# Patient Record
Sex: FEMALE | URBAN - METROPOLITAN AREA
[De-identification: names, ages, dates, MRNs, and addresses within clinical notes are randomized per-mention and may not be internally consistent; named-entity substitution may affect disease eponyms.]

---
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
## 2023-06-05 ENCOUNTER — OFFICE VISIT (OUTPATIENT)
Dept: FAMILY MEDICINE | Age: 18
End: 2023-06-05

## 2023-06-05 VITALS
TEMPERATURE: 98.3 F | OXYGEN SATURATION: 98 % | RESPIRATION RATE: 16 BRPM | HEIGHT: 63 IN | WEIGHT: 170 LBS | HEART RATE: 96 BPM | DIASTOLIC BLOOD PRESSURE: 68 MMHG | SYSTOLIC BLOOD PRESSURE: 106 MMHG | BODY MASS INDEX: 30.12 KG/M2

## 2023-06-05 DIAGNOSIS — R61 HYPERHIDROSIS: ICD-10-CM

## 2023-06-05 DIAGNOSIS — R74.8 ELEVATED ALKALINE PHOSPHATASE LEVEL: ICD-10-CM

## 2023-06-05 DIAGNOSIS — Z00.00 HEALTH MAINTENANCE EXAMINATION: Primary | ICD-10-CM

## 2023-06-05 PROCEDURE — 99385 PREV VISIT NEW AGE 18-39: CPT | Performed by: PHYSICIAN ASSISTANT

## 2023-06-05 PROCEDURE — 80053 COMPREHEN METABOLIC PANEL: CPT | Performed by: INTERNAL MEDICINE

## 2023-06-05 PROCEDURE — 80061 LIPID PANEL: CPT | Performed by: INTERNAL MEDICINE

## 2023-06-05 ASSESSMENT — PATIENT HEALTH QUESTIONNAIRE - PHQ9
SUM OF ALL RESPONSES TO PHQ9 QUESTIONS 1 AND 2: 0
CLINICAL INTERPRETATION OF PHQ2 SCORE: NO FURTHER SCREENING NEEDED
SUM OF ALL RESPONSES TO PHQ9 QUESTIONS 1 AND 2: 0
2. FEELING DOWN, DEPRESSED OR HOPELESS: NOT AT ALL
1. LITTLE INTEREST OR PLEASURE IN DOING THINGS: NOT AT ALL

## 2023-06-05 ASSESSMENT — ENCOUNTER SYMPTOMS
ABDOMINAL PAIN: 0
COUGH: 0
SHORTNESS OF BREATH: 0
DIZZINESS: 0
FEVER: 0

## 2023-06-06 PROBLEM — R74.8 ELEVATED ALKALINE PHOSPHATASE LEVEL: Status: ACTIVE | Noted: 2023-06-06

## 2023-06-06 LAB
ALBUMIN SERPL-MCNC: 3.8 G/DL (ref 3.6–5.1)
ALBUMIN/GLOB SERPL: 1.1 {RATIO} (ref 1–2.4)
ALP SERPL-CCNC: 122 UNITS/L (ref 42–110)
ALT SERPL-CCNC: 17 UNITS/L
ANION GAP SERPL CALC-SCNC: 15 MMOL/L (ref 7–19)
AST SERPL-CCNC: 17 UNITS/L
BILIRUB SERPL-MCNC: 0.4 MG/DL (ref 0.2–1)
BUN SERPL-MCNC: 7 MG/DL (ref 6–20)
BUN/CREAT SERPL: 12 (ref 7–25)
CALCIUM SERPL-MCNC: 9.1 MG/DL (ref 8.4–10.2)
CHLORIDE SERPL-SCNC: 106 MMOL/L (ref 97–110)
CHOLEST SERPL-MCNC: 200 MG/DL
CHOLEST/HDLC SERPL: 3.8 {RATIO}
CO2 SERPL-SCNC: 24 MMOL/L (ref 21–32)
CREAT SERPL-MCNC: 0.59 MG/DL (ref 0.51–0.95)
FASTING DURATION TIME PATIENT: ABNORMAL H
GFR SERPLBLD BASED ON 1.73 SQ M-ARVRAT: >90 ML/MIN
GLOBULIN SER-MCNC: 3.5 G/DL (ref 2–4)
GLUCOSE SERPL-MCNC: 96 MG/DL (ref 70–99)
HDLC SERPL-MCNC: 52 MG/DL
LDLC SERPL CALC-MCNC: 131 MG/DL
NONHDLC SERPL-MCNC: 148 MG/DL
POTASSIUM SERPL-SCNC: 4.5 MMOL/L (ref 3.4–5.1)
PROT SERPL-MCNC: 7.3 G/DL (ref 6.4–8.2)
SODIUM SERPL-SCNC: 140 MMOL/L (ref 135–145)
TRIGL SERPL-MCNC: 83 MG/DL

## 2023-07-17 ENCOUNTER — TELEPHONE (OUTPATIENT)
Dept: FAMILY MEDICINE | Age: 18
End: 2023-07-17

## 2023-07-25 ENCOUNTER — APPOINTMENT (OUTPATIENT)
Dept: FAMILY MEDICINE | Age: 18
End: 2023-07-25

## 2023-09-01 ENCOUNTER — OFFICE VISIT (OUTPATIENT)
Dept: DERMATOLOGY CLINIC | Facility: CLINIC | Age: 18
End: 2023-09-01

## 2023-09-01 ENCOUNTER — LAB ENCOUNTER (OUTPATIENT)
Dept: LAB | Age: 18
End: 2023-09-01
Attending: DERMATOLOGY
Payer: MEDICAID

## 2023-09-01 DIAGNOSIS — R61 HYPERHIDROSIS: ICD-10-CM

## 2023-09-01 DIAGNOSIS — L70.0 ACNE VULGARIS: ICD-10-CM

## 2023-09-01 DIAGNOSIS — L70.0 ACNE VULGARIS: Primary | ICD-10-CM

## 2023-09-01 DIAGNOSIS — Z51.81 MEDICATION MONITORING ENCOUNTER: ICD-10-CM

## 2023-09-01 DIAGNOSIS — L65.9 HAIR LOSS: ICD-10-CM

## 2023-09-01 LAB
BASOPHILS # BLD AUTO: 0.07 X10(3) UL (ref 0–0.2)
BASOPHILS NFR BLD AUTO: 1 %
DEPRECATED HBV CORE AB SER IA-ACNC: 16.6 NG/ML
DEPRECATED RDW RBC AUTO: 40.9 FL (ref 35.1–46.3)
DHEA-S SERPL-MCNC: 260.8 UG/DL
EOSINOPHIL # BLD AUTO: 0.48 X10(3) UL (ref 0–0.7)
EOSINOPHIL NFR BLD AUTO: 6.7 %
ERYTHROCYTE [DISTWIDTH] IN BLOOD BY AUTOMATED COUNT: 13.1 % (ref 11–15)
HCT VFR BLD AUTO: 38.6 %
HGB BLD-MCNC: 12.1 G/DL
IMM GRANULOCYTES # BLD AUTO: 0.01 X10(3) UL (ref 0–1)
IMM GRANULOCYTES NFR BLD: 0.1 %
LYMPHOCYTES # BLD AUTO: 2.91 X10(3) UL (ref 1–4)
LYMPHOCYTES NFR BLD AUTO: 40.4 %
MCH RBC QN AUTO: 26.7 PG (ref 26–34)
MCHC RBC AUTO-ENTMCNC: 31.3 G/DL (ref 31–37)
MCV RBC AUTO: 85 FL
MONOCYTES # BLD AUTO: 0.48 X10(3) UL (ref 0.1–1)
MONOCYTES NFR BLD AUTO: 6.7 %
NEUTROPHILS # BLD AUTO: 3.25 X10 (3) UL (ref 1.5–7.7)
NEUTROPHILS # BLD AUTO: 3.25 X10(3) UL (ref 1.5–7.7)
NEUTROPHILS NFR BLD AUTO: 45.1 %
PLATELET # BLD AUTO: 349 10(3)UL (ref 150–450)
RBC # BLD AUTO: 4.54 X10(6)UL
TSI SER-ACNC: 2.16 MIU/ML (ref 0.36–3.74)
VIT D+METAB SERPL-MCNC: 10.5 NG/ML (ref 30–100)
WBC # BLD AUTO: 7.2 X10(3) UL (ref 4–11)

## 2023-09-01 PROCEDURE — 82627 DEHYDROEPIANDROSTERONE: CPT

## 2023-09-01 PROCEDURE — 84443 ASSAY THYROID STIM HORMONE: CPT

## 2023-09-01 PROCEDURE — 85025 COMPLETE CBC W/AUTO DIFF WBC: CPT

## 2023-09-01 PROCEDURE — 82728 ASSAY OF FERRITIN: CPT

## 2023-09-01 PROCEDURE — 99205 OFFICE O/P NEW HI 60 MIN: CPT | Performed by: DERMATOLOGY

## 2023-09-01 PROCEDURE — 84410 TESTOSTERONE BIOAVAILABLE: CPT

## 2023-09-01 PROCEDURE — 36415 COLL VENOUS BLD VENIPUNCTURE: CPT

## 2023-09-01 PROCEDURE — 82306 VITAMIN D 25 HYDROXY: CPT

## 2023-09-01 RX ORDER — GLYCOPYRROLATE 1 MG/1
1 TABLET ORAL 2 TIMES DAILY
COMMUNITY
Start: 2023-07-31

## 2023-09-01 RX ORDER — HYDROQUINONE 40 MG/G
1 CREAM TOPICAL 2 TIMES DAILY
Qty: 30 G | Refills: 1 | Status: SHIPPED | OUTPATIENT
Start: 2023-09-01 | End: 2023-10-01

## 2023-09-01 RX ORDER — DOXYCYCLINE HYCLATE 75 MG/1
1 TABLET ORAL DAILY
Qty: 30 TABLET | Refills: 3 | Status: SHIPPED | OUTPATIENT
Start: 2023-09-01

## 2023-09-01 RX ORDER — GLYCOPYRROLATE 1 MG/1
1 TABLET ORAL 2 TIMES DAILY
Qty: 60 TABLET | Refills: 5 | Status: SHIPPED | OUTPATIENT
Start: 2023-09-01

## 2023-09-07 LAB
SEX HORM BIND GLOB: 20 NMOL/L
TESTOST % FREE+WEAK BND: 34.1 %
TESTOST FREE+WEAK BND: 11 NG/DL
TESTOSTERONE TOT /MS: 32.2 NG/DL

## 2023-09-11 ENCOUNTER — PATIENT MESSAGE (OUTPATIENT)
Dept: DERMATOLOGY CLINIC | Facility: CLINIC | Age: 18
End: 2023-09-11

## 2023-09-11 NOTE — TELEPHONE ENCOUNTER
Dr. Karyn Casey pt's medications can not have gelatin in them. Please advise. Do we contact the pharmacy to let them know or see if they have a non gelatin version? Does this mean they are not able to have capsules but tablets are ok? Thank you.

## 2023-09-11 NOTE — PROGRESS NOTES
Dr. Tyrese Penny - pt informed of lab results and agreeable to the Spironolactone. Pt states she is going to send a [employer] message with her medication requirements (pt needs medications that meet halal guidelines).

## 2023-09-11 NOTE — PROGRESS NOTES
Dario Eugene is a 19year old female. Patient presents with:  Acne: New pt present with acne on face, upper back, and arms. Pt states she was being seen by another dermato list and was prescribed tretoin but wasn't working. Derm Problem: Pt states she has grey hairs, hair thinning and hair loss and wondering if she can do something about it. Pt states she is concern about her excesses sweating for a couple years now. Pt is taking glycopyrrolate. Patient has no allergy information on record. Current Outpatient Medications   Medication Sig Dispense Refill    glycopyrrolate 1 MG Oral Tab Take 1 tablet (1 mg total) by mouth 2 (two) times daily. Doxycycline Hyclate 75 MG Oral Tab Take 1 tablet by mouth daily. 30 tablet 3    glycopyrrolate 1 MG Oral Tab Take 1 tablet (1 mg total) by mouth 2 (two) times daily. 60 tablet 5    Hydroquinone 4 % External Cream Apply 1 Application topically 2 (two) times daily for 60 doses. Use bid as directed 30 g 1    ergocalciferol 1.25 MG (03573 UT) Oral Cap Take 1 capsule (50,000 Units total) by mouth once a week. 12 capsule 2    Ferrous Sulfate  (45 Fe) MG Oral Tab CR 1 po every day 30 tablet 2      History reviewed. No pertinent past medical history. Social History:  Social History     Socioeconomic History    Marital status: Single   Tobacco Use    Smoking status: Never    Smokeless tobacco: Never   Substance and Sexual Activity    Alcohol use: Not Currently    Drug use: Never   Other Topics Concern    Breast feeding No    Reaction to local anesthetic No    Pt has a pacemaker No    Pt has a defibrillator No                 Current Outpatient Medications   Medication Sig Dispense Refill    glycopyrrolate 1 MG Oral Tab Take 1 tablet (1 mg total) by mouth 2 (two) times daily. Doxycycline Hyclate 75 MG Oral Tab Take 1 tablet by mouth daily. 30 tablet 3    glycopyrrolate 1 MG Oral Tab Take 1 tablet (1 mg total) by mouth 2 (two) times daily.  60 tablet 5 Hydroquinone 4 % External Cream Apply 1 Application topically 2 (two) times daily for 60 doses. Use bid as directed 30 g 1    ergocalciferol 1.25 MG (49374 UT) Oral Cap Take 1 capsule (50,000 Units total) by mouth once a week. 12 capsule 2    Ferrous Sulfate  (45 Fe) MG Oral Tab CR 1 po every day 30 tablet 2     Allergies:   Not on File    History reviewed. No pertinent past medical history. History reviewed. No pertinent surgical history. Social History    Socioeconomic History      Marital status: Single      Spouse name: Not on file      Number of children: Not on file      Years of education: Not on file      Highest education level: Not on file    Occupational History      Not on file    Tobacco Use      Smoking status: Never      Smokeless tobacco: Never    Substance and Sexual Activity      Alcohol use: Not Currently      Drug use: Never      Sexual activity: Not on file    Other Topics      Concerns:        Grew up on a farm: Not Asked        History of tanning: Not Asked        Outdoor occupation: Not Asked        Breast feeding: No        Reaction to local anesthetic: No        Pt has a pacemaker: No        Pt has a defibrillator: No    Social History Narrative      Not on file    Social Determinants of Health  Financial Resource Strain: Not on file  Food Insecurity: Not on file  Transportation Needs: Not on file  Physical Activity: Not on file  Stress: Not on file  Social Connections: Not on file  Housing Stability: Not on file  History reviewed. No pertinent family history. HPI :      Patient presents with:  Acne: New pt present with acne on face, upper back, and arms. Pt states she was being seen by another dermato list and was prescribed tretoin but wasn't working. Derm Problem: Pt states she has grey hairs, hair thinning and hair loss and wondering if she can do something about it. Pt states she is concern about her excesses sweating for a couple years now.  Pt is taking glycopyrrolate. Patient concerned with acne, hair loss, hyperhidrosis    Acne has not been using consistently. As noted since early teens. Does flare with cycles sister with cystic acne. Comes in waves especially around menses. Has been using moisturizers. On tretinoin last year however had significant irritation discontinued no other medications as far she is aware, history of hair thinning and loss diffusely has been to slowly progressive. Generalized over the scalp. Notes daily shedding. Blood test previously okay    Hyperhidrosis longstanding since middle school on 2 mg in the morning of glycopyrrolate notes face diffusely drips and more generalized sweating    Patient presents with concerns above. ROS:    Denies any other systemic complaints. No fevers, chills, night sweats, sensitivity to the sun, deeper lumps or bumps. No other skin complaints. History, medications, allergies as noted. Physical examination: Patient  well-developed well-nourished, alert oriented in no acute distress. Exam of involved, appropriate areas of skin performed, including scalp, head, neck, face,nails, hair, external eyes, including conjunctival mucosa, eyelids, lips, external ears, back, chest, abdomen, arms, legs, palms. Remarkable for lesions as noted     Decreased density diffusely prominent along the anterior hairline and scalp temples with hairs of numerous lengths. Acne inflammatory nodules, more prominent lesions over the arms back shoulders some areas more KP like postinflammatory hyperpigmentation overall mostly truncal grade 3  No obvious hirsutism    Hyperhidrosis face per patient no ptosis neuro exam normal.  No other susupicious lesions on todays  exam.       ASSESSMENT AND PLAN:     Acne vulgaris  (primary encounter diagnosis)  Hair loss  Medication monitoring encounter  Hyperhidrosis    Assessment / plan:    Acne. See medications in grid.   Skin care regimen discussed at length including cleansers, makeup, face washing, sunscreen. Recheck in 6 -8 weeks if no improvement. Notify us promptly if problems tolerating regimen. Consider more aggressive therapy if not responding. We will check labs given hair loss as well. Prominent postinflammatory changes history of cystic acne in her sister consider isotretinoin. We will check baseline labs  May need additional treatment. No cystic lesions currently. Patient very anxious regarding potential scarring,      Begin doxycycline,   hydroquinone for postinflammatory changes - this cosmetic medication. Continue sunscreen sun protection over-the-counter washes. Alopecia  Overall hair regimen discussed including B. vitamin supplementation, biotin to 10 mg daily-stop 3 days before any blood tests-, vitamin D3 2000 units daily, iron as appropriate, adequate protein intake (40 to 80 g daily), use of volumizing shampoos and antidandruff shampoos as appropriate. Possible addition of  minoxidil 5% foam twice daily as tolerated if patient desires. The fact this may take 2-4 months to see any significant change discussed. The fact regimen better and maintaining hair rather than regrowth reviewed  Await hormonal studies    Prior labs reviewed  May need follow-up with endocrinology or OB    No obvious scarring or significant inflammatory process no other associated symptoms. Consider additional lab tests if not improving biopsy not likely to be helpful at this point in time  Decreased density is Primus generalized with hairs of numerous lengths throughout  Supportive care with adequate nutrition importance along with managing hormonal issues that may arise    Hyperhidrosis. Meds in grid. Skin care instructions reviewed. Other potential triggers discussed Pathophysiology reviewed. Options including Botox, surgery discussed as well. No labs recommended at this time. Patient will let us know how they are doing over the next several weeks.   Await clinical response to above therapy. If stable recheck one to 2 years or p.r.n. No other susupicious lesions on todays  exam.      Pathophysiology discussed with patient. Therapeutic options reviewed. See  Medications in grid. Instructions reviewed at length. General skin care questions answered. Reassurance regarding benign skin lesions. Signs and symptoms of skin cancer, ABCDE's of melanoma briefly reviewed. Sunscreen use, sun protection, encouraged. Followup as noted in rtc or p.r.n. The patient indicates understanding of these issues and agrees to the plan. The patient is asked to return as noted in follow-up /as noted above    Encounter Times   Including precharting, reviewing chart, prior notes obtaining history: 15 minutes, medical exam :10 minutes, notes on body map, plan, counseling 35minutes My total time spent caring for the patient on the day of the encounter: 60 minutes     This note was generated using Dragon voice recognition software. Please contact me regarding any confusion resulting from errors in recognition. Note to patient and family: The [id number] makes medical notes like these available to patients. However, be advised this is a medical document. It is intended as [id number] communication and monitoring of a patient's care needs. It is written in medical language and may contain abbreviations or verbiage that are unfamiliar. It may appear blunt or direct. Medical documents are intended to carry relevant information, facts as evident and the clinical opinion of the practitioner.   Orders Placed This Encounter      CBC W Differential W Platelet      TSH W Reflex To Free T4      Dehydroepiandrosterone Sulfate      Testosterone,Total and Weakly Bound w/ SHBG      Ferritin      Vitamin D      Meds & Refills for this Visit:   Requested Prescriptions     Signed Prescriptions Disp Refills    Doxycycline Hyclate 75 MG Oral Tab 30 tablet 3     Sig: Take 1 tablet by mouth daily. glycopyrrolate 1 MG Oral Tab 60 tablet 5     Sig: Take 1 tablet (1 mg total) by mouth 2 (two) times daily. Hydroquinone 4 % External Cream 30 g 1     Sig: Apply 1 Application topically 2 (two) times daily for 60 doses. Use bid as directed       Acne vulgaris  (primary encounter diagnosis)  Hair loss  Medication monitoring encounter  Hyperhidrosis    Orders Placed This Encounter      CBC W Differential W Platelet      TSH W Reflex To Free T4      Dehydroepiandrosterone Sulfate      Testosterone,Total and Weakly Bound w/ SHBG      Ferritin      Vitamin D      Results From Past 48 Hours:  No results found for this or any previous visit (from the past 48 hour(s)). Meds This Visit:      Imaging Orders:  None     Referral Orders:  No orders of the defined types were placed in this encounter.

## 2023-09-12 RX ORDER — ERGOCALCIFEROL 1.25 MG/1
50000 CAPSULE ORAL WEEKLY
Qty: 12 CAPSULE | Refills: 2 | OUTPATIENT
Start: 2023-09-12

## 2023-09-12 RX ORDER — ERGOCALCIFEROL 1.25 MG/1
50000 CAPSULE ORAL WEEKLY
Qty: 12 CAPSULE | Refills: 2 | Status: SHIPPED | OUTPATIENT
Start: 2023-09-12

## 2023-09-12 NOTE — TELEPHONE ENCOUNTER
Please see other encounter/ test results. pharmacy provided several options that are gelatin free for the vit d. It looks like these are over-the-counter. Aldactone is a tablet, so that should be ok.   [facility] is very helpful in finding Halal options if there is any issue

## 2023-10-20 ENCOUNTER — TELEPHONE (OUTPATIENT)
Dept: FAMILY MEDICINE | Age: 18
End: 2023-10-20

## 2024-04-25 RX ORDER — GLYCOPYRROLATE 1 MG/1
1 TABLET ORAL 2 TIMES DAILY
Qty: 60 TABLET | Refills: 5 | Status: SHIPPED | OUTPATIENT
Start: 2024-04-25

## 2024-04-25 NOTE — TELEPHONE ENCOUNTER
Refill Request for medication(s):     Last Office Visit: 9/1/23    Last Refill: 9/1/23    Pharmacy, Dosage verified:  yes    Condition Update (if applicable):     Rx pended and sent to provider for approval, please advise. Thank You!